# Patient Record
Sex: FEMALE | Race: BLACK OR AFRICAN AMERICAN | Employment: OTHER | URBAN - METROPOLITAN AREA
[De-identification: names, ages, dates, MRNs, and addresses within clinical notes are randomized per-mention and may not be internally consistent; named-entity substitution may affect disease eponyms.]

---

## 2018-01-10 ENCOUNTER — TRANSFERRED RECORDS (OUTPATIENT)
Dept: HEALTH INFORMATION MANAGEMENT | Facility: CLINIC | Age: 78
End: 2018-01-10

## 2018-03-20 ENCOUNTER — TRANSFERRED RECORDS (OUTPATIENT)
Dept: HEALTH INFORMATION MANAGEMENT | Facility: CLINIC | Age: 78
End: 2018-03-20

## 2018-09-12 ENCOUNTER — MEDICAL CORRESPONDENCE (OUTPATIENT)
Dept: HEALTH INFORMATION MANAGEMENT | Facility: CLINIC | Age: 78
End: 2018-09-12

## 2018-09-12 ENCOUNTER — TRANSFERRED RECORDS (OUTPATIENT)
Dept: HEALTH INFORMATION MANAGEMENT | Facility: CLINIC | Age: 78
End: 2018-09-12

## 2018-09-12 NOTE — TELEPHONE ENCOUNTER
Referral and med recs received from [facility] (13 Pages) and sent to scanning via [employer] @ 4:33PM

## 2018-09-12 NOTE — TELEPHONE ENCOUNTER
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 Shelly Chappell - [facility]  Who called: Petra @ [facility] [phone]    Recent Studies - CARE EVERYWHERE  Imagin18, 18  Pathology: 3/20/18    Records requested from: [facility]  Records received from: [facility]

## 2018-09-23 ENCOUNTER — TRANSFERRED RECORDS (OUTPATIENT)
Dept: HEALTH INFORMATION MANAGEMENT | Facility: CLINIC | Age: 78
End: 2018-09-23

## 2018-09-24 ENCOUNTER — TRANSFERRED RECORDS (OUTPATIENT)
Dept: HEALTH INFORMATION MANAGEMENT | Facility: CLINIC | Age: 78
End: 2018-09-24

## 2018-09-25 NOTE — TELEPHONE ENCOUNTER
Med recs and imaging reports received from [facility] (56 pages) and sent to scanning via [employer] @ 2:32PM

## 2018-09-25 NOTE — TELEPHONE ENCOUNTER
Path report received from [facility] (3 pages) and sent to scanning via [employer] @ 11:36AM

## 2018-09-25 NOTE — TELEPHONE ENCOUNTER
7 images pushed to PACS from [facility] and are now resolved/viewable per Toshia at [facility] imaging dept.

## 2018-09-26 ENCOUNTER — PRE VISIT (OUTPATIENT)
Dept: ONCOLOGY | Facility: CLINIC | Age: 78
End: 2018-09-26

## 2018-09-26 NOTE — TELEPHONE ENCOUNTER
Path slides and report received from [facility] and sent to [facility] path dept.

## 2018-09-27 PROCEDURE — 00000346 ZZHCL STATISTIC REVIEW OUTSIDE SLIDES TC 88321: Performed by: OBSTETRICS & GYNECOLOGY

## 2018-10-10 ENCOUNTER — ONCOLOGY VISIT (OUTPATIENT)
Dept: ONCOLOGY | Facility: CLINIC | Age: 78
End: 2018-10-10
Attending: OBSTETRICS & GYNECOLOGY
Payer: MEDICARE

## 2018-10-10 VITALS
OXYGEN SATURATION: 99 % | HEART RATE: 102 BPM | WEIGHT: 137.2 LBS | TEMPERATURE: 97.7 F | DIASTOLIC BLOOD PRESSURE: 83 MMHG | SYSTOLIC BLOOD PRESSURE: 143 MMHG

## 2018-10-10 DIAGNOSIS — D06.9 CIN III (CERVICAL INTRAEPITHELIAL NEOPLASIA GRADE III) WITH SEVERE DYSPLASIA: Primary | ICD-10-CM

## 2018-10-10 PROCEDURE — G0463 HOSPITAL OUTPT CLINIC VISIT: HCPCS | Mod: ZF

## 2018-10-10 PROCEDURE — 99205 OFFICE O/P NEW HI 60 MIN: CPT | Mod: ZP | Performed by: OBSTETRICS & GYNECOLOGY

## 2018-10-10 RX ORDER — AMLODIPINE BESYLATE 5 MG/1
TABLET ORAL
Refills: 2 | COMMUNITY
Start: 2018-04-02

## 2018-10-10 RX ORDER — AMLODIPINE BESYLATE 5 MG/1
10 TABLET ORAL
COMMUNITY
Start: 2018-02-14

## 2018-10-10 RX ORDER — ATORVASTATIN CALCIUM 40 MG/1
40 TABLET, FILM COATED ORAL
COMMUNITY
Start: 2018-09-11

## 2018-10-10 RX ORDER — ACETAMINOPHEN 325 MG/1
650 TABLET ORAL
COMMUNITY
Start: 2018-01-09

## 2018-10-10 RX ORDER — POTASSIUM CHLORIDE 1500 MG/1
TABLET, EXTENDED RELEASE ORAL
Refills: 3 | COMMUNITY
Start: 2018-09-25

## 2018-10-10 RX ORDER — IPRATROPIUM BROMIDE AND ALBUTEROL SULFATE 2.5; .5 MG/3ML; MG/3ML
3 SOLUTION RESPIRATORY (INHALATION)
COMMUNITY
Start: 2015-06-24

## 2018-10-10 RX ORDER — PANTOPRAZOLE SODIUM 40 MG/1
40 TABLET, DELAYED RELEASE ORAL
COMMUNITY
Start: 2018-09-25

## 2018-10-10 RX ORDER — ALBUTEROL SULFATE 90 UG/1
1-2 AEROSOL, METERED RESPIRATORY (INHALATION)
COMMUNITY
Start: 2018-07-26

## 2018-10-10 RX ORDER — AMLODIPINE BESYLATE 10 MG/1
10 TABLET ORAL DAILY
Refills: 2 | COMMUNITY
Start: 2018-08-16

## 2018-10-10 RX ORDER — HYDROCHLOROTHIAZIDE 25 MG/1
TABLET ORAL
Refills: 11 | COMMUNITY
Start: 2018-09-12

## 2018-10-10 RX ORDER — ASPIRIN 81 MG/1
81 TABLET ORAL
COMMUNITY
Start: 2017-11-14

## 2018-10-10 ASSESSMENT — PAIN SCALES - GENERAL: PAINLEVEL: NO PAIN (0)

## 2018-10-10 NOTE — PROGRESS NOTES
Consult Notes on Referred Patient    Date: 10/10/2018       Dr. Shelly Chappell MD  [facility]  [address] [place] [id number]       RE: Puja Hudson  : 1940  LIDIA: 10/10/2018    Dear Dr. Shelly Chappell:    I had the pleasure of seeing your patient Puja Hudson here at the Gynecologic Cancer Clinic at the [facility] on 10/10/2018.  As you know she is a very pleasant 77 year old woman with a recent diagnosis of CIN3.  Given these findings she was subsequently sent to the Gynecologic Cancer Clinic for new patient consultation.   , vaginal delivery, went through menopause in her 50s, never been on hormone replacement therapy, never had any abnormal Paps until recently, then underwent a LEEP back in March which showed ADAM 3 with positive endocervical margins.  Slides were not reviewed here.  She has otherwise COPD, diabetes, hypertension, still is eating and drinking well.  No nausea, vomiting, fever or chills.  Normal urinary and bowel function.  No vaginal bleeding or discharge.  No B symptoms.             Past Medical History:  1.  Diabetes on metformin.   2.  Hypertension.    3.  COPD.         Past Surgical History:  LEEP.           Health Maintenance:  Health Maintenance Due   Topic Date Due     PHQ-2 Q1 YR  10/21/1954     TETANUS IMMUNIZATION (SYSTEM ASSIGNED)  10/21/1960     LIPID SCREEN Q5 YR FEMALE (SYSTEM ASSIGNED)  10/21/1987     COLON CANCER SCREEN (SYSTEM ASSIGNED)  10/21/1992     ADVANCE DIRECTIVE PLANNING Q5 YRS  10/21/1997     FALL RISK ASSESSMENT  10/21/2007     DEXA SCAN SCREENING (SYSTEM ASSIGNED)  10/21/2007     PNEUMOCOCCAL (1 of 2 - PCV13) 10/21/2007     INFLUENZA VACCINE (1) 2018     Abnormal Pap smear on the recent one that led to the LEEP with ADAM 3.  No prior abnormal Pap smears.  Had a colonoscopy several years ago.             Current Medications:     has a current medication list which includes the following prescription(s):  acetaminophen, albuterol, amlodipine, amlodipine, amlodipine, aspirin, atorvastatin, blood glucose monitoring, hydrochlorothiazide, ipratropium - albuterol 0.5 mg/2.5 mg/3 ml, metformin, pantoprazole, and potassium chloride er.       Allergies:     [unfilled]        Social History:     Social History   Substance Use Topics     Smoking status: Current Every Day Smoker     Packs/day: 0.25     Years: 20.00     Types: Cigarettes     Smokeless tobacco: Never Used      Comment: Patient has tried to quit in the past, and has nicotine gum at home.     Alcohol use 1.2 oz/week     2 Glasses of wine per week       History   Drug Use No           Family History:   Sister had breast cancer in her 50s.           Physical Exam:     /83  Pulse 102  Temp 97.7  F (36.5  C) (Oral)  Wt 62.2 kg (137 lb 3.2 oz)  SpO2 99%  There is no height or weight on file to calculate BMI.    General Appearance: healthy and alert, no distress     Psychiatric: appropriate mood and affect                               ABDOMEN:  Soft, nontender, nondistended, no organomegaly.   PELVIC:  Normal external genitalia.  Normal vaginal mucosa.  Normal-appearing cervix.  No adnexal masses or tenderness.  Rectovaginal confirms.             Assessment:    Puja Hudson is a 77 year old woman with a new diagnosis of CIN3.     A total of 60 minutes was spent with the patient, 40 minutes of which were spent in counseling the patient and/or treatment planning.      1.  ADAM 3.   2.  Diabetes.   3.  COPD.   4.  Hypertension.       Discussed with the patient we will await review of the pathology slides here.  If this reveals an invasive carcinoma, will proceed then with a PET scan and possible MRI and will determine treatment planning.  If there is no invasive carcinoma diagnosed, then we will proceed with a cold knife cone, ECC as well as D&C and then determine treatment.  If we do acquire an invasive carcinoma otherwise then will also proceed with a  hysterectomy.  The patient as well as partner agree with this plan, are very appreciative of her care.  All questions were answered.             Thank you for allowing us to participate in the care of your patient.         Sincerely,    Jacob Rodriguez MD, MS    Department of Obstetrics and Gynecology   Division of Gynecologic Oncology   [facility]  Phone: [phone]      CC  Patient Care Team:  Clinic, [facility] as PCP - General  Shelly Chappell MD as Obstetrician  SHELLY CHAPPELL

## 2018-10-10 NOTE — MR AVS SNAPSHOT
"              After Visit Summary   10/10/2018    Puja Hudson    MRN: [id number]           Patient Information     Date Of Birth          1940        Visit Information        Provider Department      10/10/2018 1:00 PM Jacob Rodriguez MD 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  Normal or non-critical lab and imaging results will be communicated to you by Evim.nethart, letter or phone within 4 business days after the clinic has received the results. If you do not hear from us within 7 days, please contact the clinic through Evim.nethart or phone. If you have a critical or abnormal lab result, we will notify you by phone as soon as possible.  Submit refill requests through markedup or call your pharmacy and they will forward the refill request to us. Please allow 3 business days for your refill to be completed.          Additional Information About Your Visit        MyChart Information     markedup lets you send messages to your doctor, view your test results, renew your prescriptions, schedule appointments and more. To sign up, go to [email or web address] . Click on \"Log in\" on the left side of the screen, which will take you to the Welcome page. Then click on \"Sign up Now\" on the right side of the page.     You will be asked to enter the access code listed below, as well as some personal information. Please follow the directions to create your username and password.     Your access code is: [email or web address]  Expires: 2018  3:52 PM     Your access code will  in 90 days. If you need help or a new code, please call your [facility] clinic or [phone].        Care EveryWhere ID     " This is your Care EveryWhere ID. This could be used by other organizations to access your [facility] medical records  [id number]        Your Vitals Were     Pulse Temperature Pulse Oximetry             102 97.7  F (36.5  C) (Oral) 99%          Blood Pressure from Last 3 Encounters:   10/10/18 143/83    Weight from Last 3 Encounters:   10/10/18 62.2 kg (137 lb 3.2 oz)              Today, you had the following     No orders found for display       Primary Care Provider Office Phone # Fax #    [facility] [phone]       [address] [facility] [address]        Equal Access to Services     ESTELLA KAYE : Hadii aad ku hadasho Soomaali, waaxda luqadaha, qaybta kaalmada adeegyada, peter mcclellann maura martinez . So [facility] [phone].    ATENCIÓN: Si habla español, tiene a torres disposición servicios gratuitos de asistencia lingüística. [facility] [phone].    We comply with applicable federal civil rights laws and [place] laws. We do not discriminate on the basis of race, color, national origin, age, disability, sex, sexual orientation, or gender identity.            Thank you!     Thank you for choosing [facility]  for your care. Our goal is always to provide you with excellent care. Hearing back from our patients is one way we can continue to improve our services. Please take a few minutes to complete the written survey that you may receive in the mail after your visit with us. Thank you!             Your Updated Medication List - Protect others around you: Learn how to safely use, store and throw away your medicines at www.disposemymeds.org.          This list is accurate as of 10/10/18 11:59 PM.  Always use your most recent med list.                   Brand Name Dispense Instructions for use Diagnosis    acetaminophen 325 MG tablet    TYLENOL     Take 650 mg by mouth        albuterol 108 (90 Base) MCG/ACT inhaler    PROAIR HFA/PROVENTIL HFA/VENTOLIN  HFA     Inhale 1-2 puffs into the lungs        * amLODIPine 5 MG tablet    NORVASC     Take 10 mg by mouth        * amLODIPine 5 MG tablet    NORVASC     TAKE 2 TABLETS (10 MG) BY MOUTH DAILY. TAKE 1 TABLET(5 MG) BY MOUTH DAILY        * amLODIPine 10 MG tablet    NORVASC     Take 10 mg by mouth daily        aspirin 81 MG EC tablet      Take 81 mg by mouth        atorvastatin 40 MG tablet    LIPITOR     Take 40 mg by mouth        FREESTYLE LITE test strip   Generic drug:  blood glucose monitoring      Test 2 times per day ** Pharmacy allowed to substitute per patient's insurance.  Diag code: 11.9        hydrochlorothiazide 25 MG tablet    HYDRODIURIL     TAKE 1 TABLET (25 MG) BY MOUTH DAILY.        ipratropium - albuterol 0.5 mg/2.5 mg/3 mL 0.5-2.5 (3) MG/3ML neb solution    DUONEB     3 mLs        metFORMIN 500 MG tablet    GLUCOPHAGE     Take 500 mg by mouth        pantoprazole 40 MG EC tablet    PROTONIX     Take 40 mg by mouth        Potassium Chloride ER 20 MEQ Tbcr      TAKE 2 TABLETS (40 MEQ) BY MOUTH DAILY        * Notice:  This list has 3 medication(s) that are the same as other medications prescribed for you. Read the directions carefully, and ask your doctor or other care provider to review them with you.

## 2018-10-10 NOTE — NURSING NOTE
Oncology Rooming Note    October 10, 2018 12:44 PM   Puja Hudson is a 77 year old female who presents for:    Chief Complaint   Patient presents with     Oncology Clinic Visit     NEW; Cervial CA     Initial Vitals: /83  Pulse 102  Temp 97.7  F (36.5  C) (Oral)  Wt 62.2 kg (137 lb 3.2 oz)  SpO2 99% There is no height or weight on file to calculate BMI. There is no height or weight on file to calculate BSA.  No Pain (0) Comment: Data Unavailable   No LMP recorded.  Allergies reviewed: Yes  Medications reviewed: Yes    Medications: Medication refills not needed today.  Pharmacy name entered into EPIC: Data Unavailable    Clinical concerns: Pt here for a consult. Patient had surgery in March, and was told she needs to have a hysterectomy by her primary doctor. Patient is concerned that she wont be able to handle Anesthesia.Jennifer was notified    10 minutes for nursing intake (face to face time)     Keysha Gibbs MA

## 2018-10-10 NOTE — LETTER
10/10/2018       RE: Puja Hudson  [address]  [facility] [address]     Dear Colleague,    Thank you for referring your patient, Puja Hudson, to the [facility]. Please see a copy of my visit note below.                            Consult Notes on Referred Patient    Date: 10/10/2018       [facility]  [address] [place] [address]       RE: Puja Hudson  : 1940  LIDIA: 10/10/2018    Dear Dr. Shelly Chappell:    I had the pleasure of seeing your patient Puja Hudson here at the Gynecologic Cancer Clinic at the [facility] on 10/10/2018.  As you know she is a very pleasant 77 year old woman with a recent diagnosis of CIN3.  Given these findings she was subsequently sent to the Gynecologic Cancer Clinic for new patient consultation.   , vaginal delivery, went through menopause in her 50s, never been on hormone replacement therapy, never had any abnormal Paps until recently, then underwent a LEEP back in March which showed ADAM 3 with positive endocervical margins.  Slides were not reviewed here.  She has otherwise COPD, diabetes, hypertension, still is eating and drinking well.  No nausea, vomiting, fever or chills.  Normal urinary and bowel function.  No vaginal bleeding or discharge.  No B symptoms.             Past Medical History:  1.  Diabetes on metformin.   2.  Hypertension.    3.  COPD.         Past Surgical History:  LEEP.           Health Maintenance:  Health Maintenance Due   Topic Date Due     PHQ-2 Q1 YR  10/21/1954     TETANUS IMMUNIZATION (SYSTEM ASSIGNED)  10/21/1960     LIPID SCREEN Q5 YR FEMALE (SYSTEM ASSIGNED)  10/21/1987     COLON CANCER SCREEN (SYSTEM ASSIGNED)  10/21/1992     ADVANCE DIRECTIVE PLANNING Q5 YRS  10/21/1997     FALL RISK ASSESSMENT  10/21/2007     DEXA SCAN SCREENING (SYSTEM ASSIGNED)  10/21/2007     PNEUMOCOCCAL (1 of 2 - PCV13) 10/21/2007     INFLUENZA VACCINE (1) 2018     Abnormal Pap smear  on the recent one that led to the LEEP with ADAM 3.  No prior abnormal Pap smears.  Had a colonoscopy several years ago.             Current Medications:     has a current medication list which includes the following prescription(s): acetaminophen, albuterol, amlodipine, amlodipine, amlodipine, aspirin, atorvastatin, blood glucose monitoring, hydrochlorothiazide, ipratropium - albuterol 0.5 mg/2.5 mg/3 ml, metformin, pantoprazole, and potassium chloride er.       Allergies:     [unfilled]        Social History:     Social History   Substance Use Topics     Smoking status: Current Every Day Smoker     Packs/day: 0.25     Years: 20.00     Types: Cigarettes     Smokeless tobacco: Never Used      Comment: Patient has tried to quit in the past, and has nicotine gum at home.     Alcohol use 1.2 oz/week     2 Glasses of wine per week       History   Drug Use No           Family History:   Sister had breast cancer in her 50s.           Physical Exam:     /83  Pulse 102  Temp 97.7  F (36.5  C) (Oral)  Wt 62.2 kg (137 lb 3.2 oz)  SpO2 99%  There is no height or weight on file to calculate BMI.    General Appearance: healthy and alert, no distress     Psychiatric: appropriate mood and affect                               ABDOMEN:  Soft, nontender, nondistended, no organomegaly.   PELVIC:  Normal external genitalia.  Normal vaginal mucosa.  Normal-appearing cervix.  No adnexal masses or tenderness.  Rectovaginal confirms.             Assessment:    Puja Hudson is a 77 year old woman with a new diagnosis of CIN3.     A total of 60 minutes was spent with the patient, 40 minutes of which were spent in counseling the patient and/or treatment planning.      1.  ADAM 3.   2.  Diabetes.   3.  COPD.   4.  Hypertension.       Discussed with the patient we will await review of the pathology slides here.  If this reveals an invasive carcinoma, will proceed then with a PET scan and possible MRI and will determine treatment  planning.  If there is no invasive carcinoma diagnosed, then we will proceed with a cold knife cone, ECC as well as D&C and then determine treatment.  If we do acquire an invasive carcinoma otherwise then will also proceed with a hysterectomy.  The patient as well as partner agree with this plan, are very appreciative of her care.  All questions were answered.             Thank you for allowing us to participate in the care of your patient.         Sincerely,    Jacob Rodriguez MD, MS    Department of Obstetrics and Gynecology   Division of Gynecologic Oncology   [facility]  Phone: [phone]      CC  Patient Care Team:  [facility], [facility] as PCP - Shelly Riley MD as Obstetrician

## 2018-10-30 LAB — COPATH REPORT: NORMAL

## 2018-12-03 NOTE — TELEPHONE ENCOUNTER
RECORDS STATUS - ALL OTHER DIAGNOSIS      RECORDS RECEIVED FROM: [facility] and CE   DATE RECEIVED: 12/03/18   NOTES STATUS DETAILS   OFFICE NOTE from referring provider Complete CE   OFFICE NOTE from medical oncologist     DISCHARGE SUMMARY from hospital     DISCHARGE REPORT from the ER     OPERATIVE REPORT     MEDICATION LIST     CLINICAL TRIAL TREATMENTS TO DATE     LABS     PATHOLOGY REPORTS Complete [facility]   ANYTHING RELATED TO DIAGNOSIS     GENONOMIC TESTING     TYPE:     IMAGING (NEED IMAGES & REPORT)     CT SCANS Complete PACS   MRI     MAMMO     ULTRASOUND Complete PACS   PET

## 2018-12-05 ENCOUNTER — PRE VISIT (OUTPATIENT)
Dept: ONCOLOGY | Facility: CLINIC | Age: 78
End: 2018-12-05